# Patient Record
Sex: FEMALE | Race: OTHER | HISPANIC OR LATINO | URBAN - METROPOLITAN AREA
[De-identification: names, ages, dates, MRNs, and addresses within clinical notes are randomized per-mention and may not be internally consistent; named-entity substitution may affect disease eponyms.]

---

## 2024-11-08 ENCOUNTER — OUTPATIENT (OUTPATIENT)
Dept: OUTPATIENT SERVICES | Facility: HOSPITAL | Age: 34
LOS: 1 days | End: 2024-11-08
Payer: COMMERCIAL

## 2024-11-08 VITALS
DIASTOLIC BLOOD PRESSURE: 62 MMHG | RESPIRATION RATE: 17 BRPM | TEMPERATURE: 99 F | SYSTOLIC BLOOD PRESSURE: 112 MMHG | HEART RATE: 102 BPM

## 2024-11-08 DIAGNOSIS — Z98.890 OTHER SPECIFIED POSTPROCEDURAL STATES: Chronic | ICD-10-CM

## 2024-11-08 DIAGNOSIS — N80.129 DEEP ENDOMETRIOSIS OF OVARY, UNSPECIFIED OVARY: Chronic | ICD-10-CM

## 2024-11-08 DIAGNOSIS — O26.899 OTHER SPECIFIED PREGNANCY RELATED CONDITIONS, UNSPECIFIED TRIMESTER: ICD-10-CM

## 2024-11-08 LAB
APPEARANCE UR: ABNORMAL
BASOPHILS # BLD AUTO: 0.04 K/UL — SIGNIFICANT CHANGE UP (ref 0–0.2)
BASOPHILS NFR BLD AUTO: 0.3 % — SIGNIFICANT CHANGE UP (ref 0–2)
BILIRUB UR-MCNC: NEGATIVE — SIGNIFICANT CHANGE UP
COLOR SPEC: YELLOW — SIGNIFICANT CHANGE UP
DIFF PNL FLD: NEGATIVE — SIGNIFICANT CHANGE UP
EOSINOPHIL # BLD AUTO: 0.03 K/UL — SIGNIFICANT CHANGE UP (ref 0–0.5)
EOSINOPHIL NFR BLD AUTO: 0.2 % — SIGNIFICANT CHANGE UP (ref 0–6)
FLUAV AG NPH QL: SIGNIFICANT CHANGE UP
FLUBV AG NPH QL: SIGNIFICANT CHANGE UP
GLUCOSE UR QL: NEGATIVE MG/DL — SIGNIFICANT CHANGE UP
HCT VFR BLD CALC: 32.2 % — LOW (ref 34.5–45)
HGB BLD-MCNC: 11 G/DL — LOW (ref 11.5–15.5)
IMM GRANULOCYTES NFR BLD AUTO: 0.6 % — SIGNIFICANT CHANGE UP (ref 0–0.9)
KETONES UR-MCNC: 15 MG/DL
LEUKOCYTE ESTERASE UR-ACNC: ABNORMAL
LYMPHOCYTES # BLD AUTO: 1.3 K/UL — SIGNIFICANT CHANGE UP (ref 1–3.3)
LYMPHOCYTES # BLD AUTO: 8.2 % — LOW (ref 13–44)
MCHC RBC-ENTMCNC: 27.3 PG — SIGNIFICANT CHANGE UP (ref 27–34)
MCHC RBC-ENTMCNC: 34.2 G/DL — SIGNIFICANT CHANGE UP (ref 32–36)
MCV RBC AUTO: 79.9 FL — LOW (ref 80–100)
MONOCYTES # BLD AUTO: 0.91 K/UL — HIGH (ref 0–0.9)
MONOCYTES NFR BLD AUTO: 5.8 % — SIGNIFICANT CHANGE UP (ref 2–14)
NEUTROPHILS # BLD AUTO: 13.45 K/UL — HIGH (ref 1.8–7.4)
NEUTROPHILS NFR BLD AUTO: 84.9 % — HIGH (ref 43–77)
NITRITE UR-MCNC: NEGATIVE — SIGNIFICANT CHANGE UP
NRBC # BLD: 0 /100 WBCS — SIGNIFICANT CHANGE UP (ref 0–0)
PH UR: 7 — SIGNIFICANT CHANGE UP (ref 5–8)
PLATELET # BLD AUTO: 278 K/UL — SIGNIFICANT CHANGE UP (ref 150–400)
PROT UR-MCNC: 30 MG/DL
RBC # BLD: 4.03 M/UL — SIGNIFICANT CHANGE UP (ref 3.8–5.2)
RBC # FLD: 14.7 % — HIGH (ref 10.3–14.5)
RSV RNA NPH QL NAA+NON-PROBE: SIGNIFICANT CHANGE UP
SARS-COV-2 RNA SPEC QL NAA+PROBE: SIGNIFICANT CHANGE UP
SP GR SPEC: 1.02 — SIGNIFICANT CHANGE UP (ref 1–1.03)
UROBILINOGEN FLD QL: 1 MG/DL — SIGNIFICANT CHANGE UP (ref 0.2–1)
WBC # BLD: 15.82 K/UL — HIGH (ref 3.8–10.5)
WBC # FLD AUTO: 15.82 K/UL — HIGH (ref 3.8–10.5)

## 2024-11-08 PROCEDURE — 96360 HYDRATION IV INFUSION INIT: CPT

## 2024-11-08 PROCEDURE — G0463: CPT

## 2024-11-08 PROCEDURE — 36415 COLL VENOUS BLD VENIPUNCTURE: CPT

## 2024-11-08 PROCEDURE — 71045 X-RAY EXAM CHEST 1 VIEW: CPT | Mod: 26

## 2024-11-08 PROCEDURE — 74018 RADEX ABDOMEN 1 VIEW: CPT | Mod: 26

## 2024-11-08 PROCEDURE — 99212 OFFICE O/P EST SF 10 MIN: CPT | Mod: 25

## 2024-11-08 PROCEDURE — 85025 COMPLETE CBC W/AUTO DIFF WBC: CPT

## 2024-11-08 PROCEDURE — 87077 CULTURE AEROBIC IDENTIFY: CPT

## 2024-11-08 PROCEDURE — 81001 URINALYSIS AUTO W/SCOPE: CPT

## 2024-11-08 PROCEDURE — 71045 X-RAY EXAM CHEST 1 VIEW: CPT

## 2024-11-08 PROCEDURE — 87637 SARSCOV2&INF A&B&RSV AMP PRB: CPT

## 2024-11-08 PROCEDURE — 59025 FETAL NON-STRESS TEST: CPT

## 2024-11-08 PROCEDURE — 59025 FETAL NON-STRESS TEST: CPT | Mod: 26

## 2024-11-08 PROCEDURE — 87086 URINE CULTURE/COLONY COUNT: CPT

## 2024-11-08 PROCEDURE — 74018 RADEX ABDOMEN 1 VIEW: CPT

## 2024-11-08 RX ADMIN — Medication 1000 MILLILITER(S): at 10:50

## 2024-11-08 NOTE — OB PROVIDER TRIAGE NOTE - NSHPPHYSICALEXAM_GEN_ALL_CORE
gen: no acute distress  Vital Signs Last 24 Hrs  T(C): 37.3 (08 Nov 2024 09:43), Max: 37.3 (08 Nov 2024 09:43)  T(F): 99.2 (08 Nov 2024 09:43), Max: 99.2 (08 Nov 2024 09:43)  HR: 102 (08 Nov 2024 09:43) (102 - 102)  BP: 112/62 (08 Nov 2024 09:43) (112/62 - 112/62)  BP(mean): --  RR: 17 (08 Nov 2024 09:43) (17 - 17)  SpO2: --    Parameters below as of 08 Nov 2024 09:43  Patient On (Oxygen Delivery Method): room air    gen: nad  lungs: CTA b/l, no wheezing or other abn sounds  abd: gravid, soft non tender gen: no acute distress  Vital Signs Last 24 Hrs  T(C): 37.3 (08 Nov 2024 09:43), Max: 37.3 (08 Nov 2024 09:43)  T(F): 99.2 (08 Nov 2024 09:43), Max: 99.2 (08 Nov 2024 09:43)  HR: 102 (08 Nov 2024 09:43) (102 - 102)  BP: 112/62 (08 Nov 2024 09:43) (112/62 - 112/62)  BP(mean): --  RR: 17 (08 Nov 2024 09:43) (17 - 17)  SpO2: --    Parameters below as of 08 Nov 2024 09:43  Patient On (Oxygen Delivery Method): room air    gen: nad  lungs: CTA b/l, no wheezing or other abn sounds  abd: gravid, soft non tender    baseline 145 moderate  variability +accels  toco none

## 2024-11-08 NOTE — CHART NOTE - NSCHARTNOTEFT_GEN_A_CORE
xray  reviewed    < from: Xray Chest 1 View AP/PA (11.08.24 @ 16:12) >      ACC: [id number] EXAM:  XR CHEST AP OR PA 1V   ORDERED BY: MONICA SWANN     PROCEDURE DATE:  11/08/2024          INTERPRETATION:  CLINICAL HISTORY: fever x 2 day. Evaluate for pneumonia.    TECHNIQUE: Frontal view of the chest.    COMPARISON: None    FINDINGS:  Lungs/Pleura: No focal consolidations.  No pneumothorax. No pleural   effusions.    Heart/Mediastinum: The cardiomediastinal silhouette is within normal   limits.    Osseous Structures: No acute findings.    IMPRESSION:  Clear lungs.    < end of copied text >    no acute intervention warranted

## 2024-11-08 NOTE — OB PROVIDER TRIAGE NOTE - NSOBPROVIDERNOTE_OBGYN_ALL_OB_FT
a/p siup @ 36w2d fever at home 102, currently afebrile  -cbc, ua, LR bolus  - continue to monitor  - nst reviewed by Dr Peter, house attending  - Dr Judge notified    addendum for 1241pm  labs reviewed with DR Orozco  wbc elevated-recommends chest xray to r/o pneumonia a/p siup @ 36w2d fever at home 102, currently afebrile w/o use of antipyretics  - cbc, ua, LR bolus  - continue to monitor  - nst reviewed by nicole Bingham attending  - Dr Judge notified    addendum for 1241pm  labs reviewed with DR Judge  wbc elevated-recommends chest xray to r/o pneumonia    addendum for 1436  Patient still awaiting xray  patient states she can no longer wait  patient is now declining xray  patient states she "feels better after the IV."  DR Antunez notified,  left voicemail  MIMI Mao,  notified as well    -patient encouraged to return to L+D if fever returns, any vaginal bleeding, contraction pain or any other concerns  -kick counts  - labor precautions given a/p siup @ 36w2d fever at home 102, currently afebrile w/o use of antipyretics  - cbc, ua, LR bolus  - continue to monitor  - nst reviewed by nicole Bingham attending  - Dr Judge notified    addendum for 1241pm  labs reviewed with DR Judge  wbc elevated-recommends chest xray to r/o pneumonia    addendum for 1436  Patient still awaiting xray  patient states she can no longer wait  patient is now declining xray  patient states she "feels better after the IV."  DR Antunez notified,  left voicemail  MIMI Mao,  notified as well      Asst. manager spoke Radiology manager  Xray tech enroute    -will d/c home after completion of xray  -will call patient of any abnormal findings  -patient encouraged to return to L+D if fever returns, any vaginal bleeding, contraction pain or any other concerns  -kick counts  - labor precautions given  nst reviewed by nicole Bingham attending  Dr Antunez aware

## 2024-11-08 NOTE — OB PROVIDER TRIAGE NOTE - HISTORY OF PRESENT ILLNESS
35YO  at 36w2d ETTA:2024 presents to triage c/o  fever x 2 days off and on. Patient states called her  primary OB and he recommends evaluation.   Patient was told to take theraflu. Patient did not take any medication  Patient was evaluated on 11/3 and RVP was positive for entero/rhino virus  Patient states she is nolonger coughing, just some mild congestion  admits to fetal movement  denies contractions, leakage of fluids, contractions or another concerns.     Prenatal care: [facility], last visit on 2024- wnl    History  OBHx: SAB w/D&C , Medical TOP 2018  GYNHx: notes h/o fibroids, cysts, and endometriosis denies  STDs, or abnormal pap smears  PMH: Denies  Meds: PNV, iron, Vit d, Vit c  PSHx: RT ovarian cystectomy, D&C  Allergies: NKA  Social: Denies tobacco, EtOH, and drug use  Psych: Denies anxiety, depression, PTSD, or previous suicide attempts. Pt feels safe at home.

## 2024-11-08 NOTE — OB PROVIDER TRIAGE NOTE - NS_OBGYNHISTORY_OBGYN_ALL_OB_FT
OBHx: SAB w/D&C 2023, Medical TOP 2018  GYNHx: notes h/o fibroids, cysts, and endometriosis denies  STDs, or abnormal pap smears

## 2024-11-08 NOTE — OB PROVIDER TRIAGE NOTE - ADDITIONAL INSTRUCTIONS
a/p siup @ 36w2d fever at home 102, currently afebrile w/o use of antipyretics, nst cat reactive  -will d/c home after completion of xray  -will call patient of any abnormal findings  -patient encouraged to return to L+D if fever returns, any vaginal bleeding, contraction pain or any other concerns  -kick counts  - follow up with DR Antunez, in office on 11/12 or sooner as needed  - labor precautions given

## 2024-11-10 LAB
CULTURE RESULTS: ABNORMAL
SPECIMEN SOURCE: SIGNIFICANT CHANGE UP

## 2024-11-11 DIAGNOSIS — N83.209 UNSPECIFIED OVARIAN CYST, UNSPECIFIED SIDE: ICD-10-CM

## 2024-11-11 DIAGNOSIS — D25.9 LEIOMYOMA OF UTERUS, UNSPECIFIED: ICD-10-CM

## 2024-11-11 DIAGNOSIS — R50.9 FEVER, UNSPECIFIED: ICD-10-CM

## 2024-11-11 DIAGNOSIS — O99.891 OTHER SPECIFIED DISEASES AND CONDITIONS COMPLICATING PREGNANCY: ICD-10-CM

## 2024-11-11 DIAGNOSIS — Z3A.36 36 WEEKS GESTATION OF PREGNANCY: ICD-10-CM

## 2024-11-11 DIAGNOSIS — O09.293 SUPERVISION OF PREGNANCY WITH OTHER POOR REPRODUCTIVE OR OBSTETRIC HISTORY, THIRD TRIMESTER: ICD-10-CM

## 2024-11-11 DIAGNOSIS — O26.893 OTHER SPECIFIED PREGNANCY RELATED CONDITIONS, THIRD TRIMESTER: ICD-10-CM

## 2024-11-11 DIAGNOSIS — O34.83 MATERNAL CARE FOR OTHER ABNORMALITIES OF PELVIC ORGANS, THIRD TRIMESTER: ICD-10-CM

## 2024-11-11 DIAGNOSIS — N80.109 ENDOMETRIOSIS OF OVARY, UNSPECIFIED SIDE, UNSPECIFIED DEPTH: ICD-10-CM

## 2024-11-11 DIAGNOSIS — Z86.16 PERSONAL HISTORY OF COVID-19: ICD-10-CM

## 2024-11-22 ENCOUNTER — APPOINTMENT (OUTPATIENT)
Dept: ANTEPARTUM | Facility: CLINIC | Age: 34
End: 2024-11-22
Payer: COMMERCIAL

## 2024-11-22 ENCOUNTER — ASOB RESULT (OUTPATIENT)
Age: 34
End: 2024-11-22

## 2024-11-22 PROCEDURE — 76819 FETAL BIOPHYS PROFIL W/O NST: CPT | Mod: 59

## 2024-11-22 PROCEDURE — 76816 OB US FOLLOW-UP PER FETUS: CPT

## 2025-05-08 NOTE — OB RN TRIAGE NOTE - NS_SOURCEOFINFO_OBGYN_ALL_OB
Ty Montoya,  Thanks for coming in for the previsit labs.  Your A1c is just a tiny bit above the goal of less than 7.  The metabolic panel which looks at kidney function and electrolytes was normal except for your glucose was 149 at the time of testing.  We will of course review these results in more detail at your upcoming visit.  Kind regards,  Micaela Padilla MD     Patient